# Patient Record
Sex: FEMALE | Race: BLACK OR AFRICAN AMERICAN | NOT HISPANIC OR LATINO
[De-identification: names, ages, dates, MRNs, and addresses within clinical notes are randomized per-mention and may not be internally consistent; named-entity substitution may affect disease eponyms.]

---

## 2017-06-09 PROBLEM — Z00.00 ENCOUNTER FOR PREVENTIVE HEALTH EXAMINATION: Status: ACTIVE | Noted: 2017-06-09

## 2017-06-19 ENCOUNTER — APPOINTMENT (OUTPATIENT)
Dept: HEART AND VASCULAR | Facility: CLINIC | Age: 42
End: 2017-06-19

## 2018-09-28 ENCOUNTER — APPOINTMENT (OUTPATIENT)
Dept: HEART AND VASCULAR | Facility: CLINIC | Age: 43
End: 2018-09-28
Payer: MEDICAID

## 2018-09-28 DIAGNOSIS — N92.0 EXCESSIVE AND FREQUENT MENSTRUATION WITH REGULAR CYCLE: ICD-10-CM

## 2018-09-28 DIAGNOSIS — R14.0 ABDOMINAL DISTENSION (GASEOUS): ICD-10-CM

## 2018-09-28 PROCEDURE — 99204 OFFICE O/P NEW MOD 45 MIN: CPT

## 2018-10-09 PROBLEM — R14.0 ABDOMINAL BLOATING: Status: ACTIVE | Noted: 2018-10-09

## 2018-10-09 PROBLEM — N92.0 MENORRHAGIA: Status: ACTIVE | Noted: 2018-10-09

## 2018-10-14 ENCOUNTER — APPOINTMENT (OUTPATIENT)
Dept: MRI IMAGING | Facility: HOSPITAL | Age: 43
End: 2018-10-14

## 2018-10-14 ENCOUNTER — OUTPATIENT (OUTPATIENT)
Dept: OUTPATIENT SERVICES | Facility: HOSPITAL | Age: 43
LOS: 1 days | End: 2018-10-14
Payer: COMMERCIAL

## 2018-10-14 PROCEDURE — 72196 MRI PELVIS W/DYE: CPT | Mod: 26

## 2018-10-14 PROCEDURE — A9585: CPT

## 2018-10-14 PROCEDURE — 72196 MRI PELVIS W/DYE: CPT

## 2018-11-02 ENCOUNTER — CLINICAL ADVICE (OUTPATIENT)
Age: 43
End: 2018-11-02

## 2018-11-26 VITALS
OXYGEN SATURATION: 99 % | TEMPERATURE: 98 F | WEIGHT: 119.93 LBS | DIASTOLIC BLOOD PRESSURE: 112 MMHG | HEART RATE: 56 BPM | HEIGHT: 64 IN | SYSTOLIC BLOOD PRESSURE: 177 MMHG | RESPIRATION RATE: 16 BRPM

## 2018-11-26 RX ORDER — CHLORHEXIDINE GLUCONATE 213 G/1000ML
1 SOLUTION TOPICAL ONCE
Qty: 0 | Refills: 0 | Status: DISCONTINUED | OUTPATIENT
Start: 2018-11-27 | End: 2018-11-28

## 2018-11-26 NOTE — H&P ADULT - ASSESSMENT
42-year-old  female, occasional marijuana smoker, with FamHx of CVA (grandfather) and PMHx of HTN with ?TIA 2/2 HTN Emergency per pt (2 years ago) and known multiple uterine fibroids who presents for Uterine fibroid embolization under general anesthesia       ASA III Mallampati III  Consent to be obtained by Dr. Diaz's team  Started IVF NS @ 75cc/h     Risks & benefits of procedure and alternative therapy have been explained to the patient including but not limited to: allergic reaction, bleeding w/possible need for blood transfusion, infection, renal and vascular compromise, limb damage, emergent surgery. Informed consent obtained and in chart. 42-year-old  female, occasional marijuana smoker, with FamHx of CVA (grandfather) and PMHx of HTN with ?TIA 2/2 HTN Emergency per pt (2 years ago) and known multiple uterine fibroids who presents for Uterine fibroid embolization under general anesthesia       ASA III Mallampati III  Consent to be obtained by Dr. Diaz's team      Risks & benefits of procedure and alternative therapy have been explained to the patient including but not limited to: allergic reaction, bleeding w/possible need for blood transfusion, infection, renal and vascular compromise, limb damage, emergent surgery. Informed consent obtained and in chart. 42-year-old  female, occasional marijuana smoker, with FamHx of CVA (grandfather) and PMHx of HTN with ?TIA 2/2 HTN Emergency per pt (2 years ago) and known multiple uterine fibroids who presents for Uterine fibroid embolization under general anesthesia       ASA III Mallampati III  Consent to be obtained by Dr. Diaz's team  Pt /112 repeat /120. Per pt not any home meds for BP. Pt given IV Hydralazine 10mg x1      Risks & benefits of procedure and alternative therapy have been explained to the patient including but not limited to: allergic reaction, bleeding w/possible need for blood transfusion, infection, renal and vascular compromise, limb damage, emergent surgery. Informed consent obtained and in chart.

## 2018-11-26 NOTE — H&P ADULT - FAMILY HISTORY
Grandparent  Still living? No  Family history of cerebrovascular accident (CVA), Age at diagnosis: Age Unknown

## 2018-11-26 NOTE — H&P ADULT - HISTORY OF PRESENT ILLNESS
42-year-old  female, occasional marijuana smoker, with FamHx of CVA (grandfather) and PMHx of HTN with ?TIA 2/2 HTN Emergency per pt (2 years ago) and known multiple uterine fibroids who presented to Dr. Diaz c/o very heavy periods over the past year, to the point where her hemoglobin was down to 6 requiring blood transfusion ([facility]) about 6 months ago. She says an ultrasound at that time showed uterine fibroids. While she said she does not have a regular gynecologist, she states that when she was in the hospital for the menorrhagia this year they did a biopsy and Pap smear. She says in addition to the menorrhagia which lasts about 7 days as opposed to her former three-day period, she also has bulk symptoms with urinary frequency and lower abdominal pressure. She thinks she wants to have one more child but is not sure. Treatment options including hysterectomy, fibroid embolization and other possibilities were discussed with Dr. Diaz and pt was sent for MRI to assess for candidacy regarding uterine fibroid embolization. MRI Pelvis w/ oral and IV contrast on 10/14/18 revealed fibroid uterus containing at least 5 enhancing intramural fibroids, arcuate uterus, and adenomyosis at the uterine fundus making her a good candidate for uterine fibroid embolization with Dr. Diaz.

## 2018-11-27 ENCOUNTER — INPATIENT (INPATIENT)
Facility: HOSPITAL | Age: 43
LOS: 0 days | Discharge: HOME | DRG: 750 | End: 2018-11-28
Attending: RADIOLOGY | Admitting: RADIOLOGY
Payer: COMMERCIAL

## 2018-11-27 DIAGNOSIS — Z98.82 BREAST IMPLANT STATUS: Chronic | ICD-10-CM

## 2018-11-27 DIAGNOSIS — Z98.891 HISTORY OF UTERINE SCAR FROM PREVIOUS SURGERY: Chronic | ICD-10-CM

## 2018-11-27 LAB
ANION GAP SERPL CALC-SCNC: 11 MMOL/L — SIGNIFICANT CHANGE UP (ref 5–17)
APTT BLD: 28 SEC — SIGNIFICANT CHANGE UP (ref 27.5–36.3)
BASOPHILS NFR BLD AUTO: 0.3 % — SIGNIFICANT CHANGE UP (ref 0–2)
BUN SERPL-MCNC: 12 MG/DL — SIGNIFICANT CHANGE UP (ref 7–23)
CALCIUM SERPL-MCNC: 8.8 MG/DL — SIGNIFICANT CHANGE UP (ref 8.4–10.5)
CHLORIDE SERPL-SCNC: 107 MMOL/L — SIGNIFICANT CHANGE UP (ref 96–108)
CO2 SERPL-SCNC: 23 MMOL/L — SIGNIFICANT CHANGE UP (ref 22–31)
CREAT SERPL-MCNC: 0.75 MG/DL — SIGNIFICANT CHANGE UP (ref 0.5–1.3)
EOSINOPHIL NFR BLD AUTO: 2.1 % — SIGNIFICANT CHANGE UP (ref 0–6)
GLUCOSE SERPL-MCNC: 95 MG/DL — SIGNIFICANT CHANGE UP (ref 70–99)
HCG SERPL-ACNC: <.1 MIU/ML — SIGNIFICANT CHANGE UP
HCT VFR BLD CALC: 33.8 % — LOW (ref 34.5–45)
HGB BLD-MCNC: 11.1 G/DL — LOW (ref 11.5–15.5)
INR BLD: 1.15 — SIGNIFICANT CHANGE UP (ref 0.88–1.16)
LYMPHOCYTES # BLD AUTO: 19.3 % — SIGNIFICANT CHANGE UP (ref 13–44)
MCHC RBC-ENTMCNC: 26.6 PG — LOW (ref 27–34)
MCHC RBC-ENTMCNC: 32.8 G/DL — SIGNIFICANT CHANGE UP (ref 32–36)
MCV RBC AUTO: 80.9 FL — SIGNIFICANT CHANGE UP (ref 80–100)
MONOCYTES NFR BLD AUTO: 7.8 % — SIGNIFICANT CHANGE UP (ref 2–14)
NEUTROPHILS NFR BLD AUTO: 70.5 % — SIGNIFICANT CHANGE UP (ref 43–77)
PLATELET # BLD AUTO: 213 K/UL — SIGNIFICANT CHANGE UP (ref 150–400)
POTASSIUM SERPL-MCNC: 3.3 MMOL/L — LOW (ref 3.5–5.3)
POTASSIUM SERPL-SCNC: 3.3 MMOL/L — LOW (ref 3.5–5.3)
PROTHROM AB SERPL-ACNC: 13.1 SEC — HIGH (ref 10–12.9)
RBC # BLD: 4.18 M/UL — SIGNIFICANT CHANGE UP (ref 3.8–5.2)
RBC # FLD: 15.3 % — SIGNIFICANT CHANGE UP (ref 10.3–16.9)
SODIUM SERPL-SCNC: 141 MMOL/L — SIGNIFICANT CHANGE UP (ref 135–145)
WBC # BLD: 7.2 K/UL — SIGNIFICANT CHANGE UP (ref 3.8–10.5)
WBC # FLD AUTO: 7.2 K/UL — SIGNIFICANT CHANGE UP (ref 3.8–10.5)

## 2018-11-27 PROCEDURE — 37243 VASC EMBOLIZE/OCCLUDE ORGAN: CPT

## 2018-11-27 PROCEDURE — 93010 ELECTROCARDIOGRAM REPORT: CPT | Mod: 59

## 2018-11-27 RX ORDER — HYDRALAZINE HCL 50 MG
10 TABLET ORAL ONCE
Qty: 0 | Refills: 0 | Status: COMPLETED | OUTPATIENT
Start: 2018-11-27 | End: 2018-11-27

## 2018-11-27 RX ORDER — ONDANSETRON 8 MG/1
4 TABLET, FILM COATED ORAL EVERY 4 HOURS
Qty: 0 | Refills: 0 | Status: DISCONTINUED | OUTPATIENT
Start: 2018-11-27 | End: 2018-11-28

## 2018-11-27 RX ORDER — SODIUM CHLORIDE 9 MG/ML
1000 INJECTION INTRAMUSCULAR; INTRAVENOUS; SUBCUTANEOUS
Qty: 0 | Refills: 0 | Status: DISCONTINUED | OUTPATIENT
Start: 2018-11-27 | End: 2018-11-28

## 2018-11-27 RX ORDER — CEFAZOLIN SODIUM 1 G
1000 VIAL (EA) INJECTION EVERY 8 HOURS
Qty: 0 | Refills: 0 | Status: COMPLETED | OUTPATIENT
Start: 2018-11-27 | End: 2018-11-28

## 2018-11-27 RX ORDER — HYDROMORPHONE HYDROCHLORIDE 2 MG/ML
30 INJECTION INTRAMUSCULAR; INTRAVENOUS; SUBCUTANEOUS
Qty: 0 | Refills: 0 | Status: DISCONTINUED | OUTPATIENT
Start: 2018-11-27 | End: 2018-11-28

## 2018-11-27 RX ORDER — KETOROLAC TROMETHAMINE 30 MG/ML
15 SYRINGE (ML) INJECTION EVERY 6 HOURS
Qty: 0 | Refills: 0 | Status: DISCONTINUED | OUTPATIENT
Start: 2018-11-27 | End: 2018-11-28

## 2018-11-27 RX ADMIN — Medication 15 MILLIGRAM(S): at 16:20

## 2018-11-27 RX ADMIN — Medication 100 MILLIGRAM(S): at 16:20

## 2018-11-27 RX ADMIN — ONDANSETRON 4 MILLIGRAM(S): 8 TABLET, FILM COATED ORAL at 16:09

## 2018-11-27 RX ADMIN — Medication 15 MILLIGRAM(S): at 21:17

## 2018-11-27 RX ADMIN — ONDANSETRON 4 MILLIGRAM(S): 8 TABLET, FILM COATED ORAL at 20:47

## 2018-11-27 RX ADMIN — Medication 15 MILLIGRAM(S): at 16:13

## 2018-11-27 RX ADMIN — Medication 10 MILLIGRAM(S): at 09:59

## 2018-11-27 RX ADMIN — SODIUM CHLORIDE 75 MILLILITER(S): 9 INJECTION INTRAMUSCULAR; INTRAVENOUS; SUBCUTANEOUS at 12:08

## 2018-11-27 RX ADMIN — HYDROMORPHONE HYDROCHLORIDE 30 MILLILITER(S): 2 INJECTION INTRAMUSCULAR; INTRAVENOUS; SUBCUTANEOUS at 12:09

## 2018-11-27 RX ADMIN — Medication 15 MILLIGRAM(S): at 21:42

## 2018-11-27 NOTE — BRIEF OPERATIVE NOTE - PROCEDURE
<<-----Click on this checkbox to enter Procedure Transcatheter embolization  11/27/2018    Active  SARI

## 2018-11-27 NOTE — BRIEF OPERATIVE NOTE - COMMENTS
At end of case, sheath removed from groin, and hemostasis achieved after 15 minutes of manual compression. Groin soft, no hematoma.

## 2018-11-28 ENCOUNTER — TRANSCRIPTION ENCOUNTER (OUTPATIENT)
Age: 43
End: 2018-11-28

## 2018-11-28 VITALS — TEMPERATURE: 99 F

## 2018-11-28 LAB
ANION GAP SERPL CALC-SCNC: 17 MMOL/L — SIGNIFICANT CHANGE UP (ref 5–17)
BUN SERPL-MCNC: 14 MG/DL — SIGNIFICANT CHANGE UP (ref 7–23)
CALCIUM SERPL-MCNC: 9 MG/DL — SIGNIFICANT CHANGE UP (ref 8.4–10.5)
CHLORIDE SERPL-SCNC: 99 MMOL/L — SIGNIFICANT CHANGE UP (ref 96–108)
CO2 SERPL-SCNC: 21 MMOL/L — LOW (ref 22–31)
CREAT SERPL-MCNC: 0.82 MG/DL — SIGNIFICANT CHANGE UP (ref 0.5–1.3)
GLUCOSE SERPL-MCNC: 185 MG/DL — HIGH (ref 70–99)
HCT VFR BLD CALC: 31.5 % — LOW (ref 34.5–45)
HGB BLD-MCNC: 10.5 G/DL — LOW (ref 11.5–15.5)
MCHC RBC-ENTMCNC: 27.2 PG — SIGNIFICANT CHANGE UP (ref 27–34)
MCHC RBC-ENTMCNC: 33.3 G/DL — SIGNIFICANT CHANGE UP (ref 32–36)
MCV RBC AUTO: 81.6 FL — SIGNIFICANT CHANGE UP (ref 80–100)
PLATELET # BLD AUTO: 214 K/UL — SIGNIFICANT CHANGE UP (ref 150–400)
POTASSIUM SERPL-MCNC: 3.6 MMOL/L — SIGNIFICANT CHANGE UP (ref 3.5–5.3)
POTASSIUM SERPL-SCNC: 3.6 MMOL/L — SIGNIFICANT CHANGE UP (ref 3.5–5.3)
RBC # BLD: 3.86 M/UL — SIGNIFICANT CHANGE UP (ref 3.8–5.2)
RBC # FLD: 15.4 % — SIGNIFICANT CHANGE UP (ref 10.3–16.9)
SODIUM SERPL-SCNC: 137 MMOL/L — SIGNIFICANT CHANGE UP (ref 135–145)
WBC # BLD: 14.6 K/UL — HIGH (ref 3.8–10.5)
WBC # FLD AUTO: 14.6 K/UL — HIGH (ref 3.8–10.5)

## 2018-11-28 RX ORDER — POTASSIUM CHLORIDE 20 MEQ
40 PACKET (EA) ORAL ONCE
Qty: 0 | Refills: 0 | Status: COMPLETED | OUTPATIENT
Start: 2018-11-28 | End: 2018-11-28

## 2018-11-28 RX ORDER — CEPHALEXIN 500 MG
1 CAPSULE ORAL
Qty: 28 | Refills: 0 | OUTPATIENT
Start: 2018-11-28 | End: 2018-12-04

## 2018-11-28 RX ORDER — KETOROLAC TROMETHAMINE 30 MG/ML
1 SYRINGE (ML) INJECTION
Qty: 12 | Refills: 0 | OUTPATIENT
Start: 2018-11-28 | End: 2018-11-30

## 2018-11-28 RX ADMIN — Medication 15 MILLIGRAM(S): at 06:00

## 2018-11-28 RX ADMIN — Medication 100 MILLIGRAM(S): at 00:07

## 2018-11-28 RX ADMIN — Medication 15 MILLIGRAM(S): at 05:17

## 2018-11-28 RX ADMIN — Medication 15 MILLIGRAM(S): at 11:24

## 2018-11-28 RX ADMIN — Medication 40 MILLIEQUIVALENT(S): at 11:24

## 2018-11-28 RX ADMIN — Medication 100 MILLIGRAM(S): at 07:53

## 2018-11-28 RX ADMIN — Medication 15 MILLIGRAM(S): at 12:20

## 2018-11-28 NOTE — DISCHARGE NOTE ADULT - CARE PROVIDER_API CALL
Roshan Diaz (MD), Diagnostic Radiology  [address]  [address] [place] [address]  Phone: [phone]  Fax: [phone]

## 2018-11-28 NOTE — DISCHARGE NOTE ADULT - PLAN OF CARE
continue medications, follow up You underwent a uterine fibroid embolization. You can remove the dressing over the right groin tonight. For any bleeding or hematoma formation (hardened blood collection under the skin) at the access site of right groin, please hold pressure and go to the emergency room. Avoid baths or swimming for 5 days, you may shower. Avoid strenuous activity for 5 days. Continue keflex 4 times a day for one week to prevent infection. Take pain medication as needed. Please follow up with Dr. Diaz in 4 weeks.

## 2018-11-28 NOTE — DISCHARGE NOTE ADULT - CARE PLAN
Principal Discharge DX:	Uterine fibroid  Goal:	continue medications, follow up  Assessment and plan of treatment:	You underwent a uterine fibroid embolization. You can remove the dressing over the right groin tonight. For any bleeding or hematoma formation (hardened blood collection under the skin) at the access site of right groin, please hold pressure and go to the emergency room. Avoid baths or swimming for 5 days, you may shower. Avoid strenuous activity for 5 days. Continue keflex 4 times a day for one week to prevent infection. Take pain medication as needed. Please follow up with Dr. Diaz in 4 weeks.

## 2018-11-28 NOTE — DISCHARGE NOTE ADULT - MEDICATION SUMMARY - MEDICATIONS TO TAKE
I will START or STAY ON the medications listed below when I get home from the hospital:    Percocet 5/325 oral tablet  -- 1 tab(s) by mouth every 4 hours, As Needed -for severe pain MDD:6   -- Caution federal law prohibits the transfer of this drug to any person other  than the person for whom it was prescribed.  May cause drowsiness.  Alcohol may intensify this effect.  Use care when operating dangerous machinery.  This prescription cannot be refilled.  This product contains acetaminophen.  Do not use  with any other product containing acetaminophen to prevent possible liver damage.  Using more of this medication than prescribed may cause serious breathing problems.    -- Indication: For as needed for pain    ketorolac 10 mg oral tablet  -- 1 tab(s) by mouth every 6 hours   -- It is very important that you take or use this exactly as directed.  Do not skip doses or discontinue unless directed by your doctor.  May cause drowsiness or dizziness.  Obtain medical advice before taking any non-prescription drugs as some may affect the action of this medication.  Take with food or milk.    -- Indication: For as needed for pain    Keflex 500 mg oral capsule  -- 1 cap(s) by mouth 4 times a day   -- Finish all this medication unless otherwise directed by prescriber.    -- Indication: For infection prevention

## 2018-11-28 NOTE — DISCHARGE NOTE ADULT - HOSPITAL COURSE
41yo  F, occasional marijuana smoker, with FamHx of CVA (grandfather) and PMHx of HTN with ?TIA 2/2 HTN Emergency per pt (2 years ago) and known multiple uterine fibroids who presented to Dr. Diaz c/o very heavy periods over the past year, to the point where her hemoglobin was down to 6 requiring blood transfusion ([facility]) about 6 months ago. She says an ultrasound at that time showed uterine fibroids. While she said she does not have a regular gynecologist, she states that when she was in the hospital for the menorrhagia this year they did a biopsy and Pap smear. She says in addition to the menorrhagia which lasts about 7 days as opposed to her former three-day period, she also has bulk symptoms with urinary frequency and lower abdominal pressure. She thinks she wants to have one more child but is not sure. Treatment options including hysterectomy, fibroid embolization and other possibilities were discussed with Dr. Diaz and pt was sent for MRI to assess for candidacy regarding uterine fibroid embolization. MRI Pelvis w/ oral and IV contrast on 10/14/18 revealed fibroid uterus containing at least 5 enhancing intramural fibroids, arcuate uterus, and adenomyosis at the uterine fundus making her a good candidate for uterine fibroid embolization with Dr. Diaz. Patient underwent uterine fibroid embolization on 18. Patient was seen and examined this AM and was asymptomatic without complaints. Patient's VSS, labs and tele reviewed. Patient is stable for discharge per Dr. Diaz. Patient has been given appropriate discharge instructions including medication regimen, access site management and follow up.    Temp 96.9F, HR 86bpm, /87, RR 18, O2 sat 100% RA  Gen: NAD, A&O x 3  Cards: RRR, clear S1 and S2 without murmur  Pulm: CTA b/l without w/r/r  Abd: BS x 4, soft, NT  Right groin: No hematoma or ooze, femoral pulse 2+ no bruit, DP/PT 2+  Ext: No LE edema or ulcerations b/l

## 2018-11-28 NOTE — DISCHARGE NOTE ADULT - PATIENT PORTAL LINK FT
You can access the [facility] Patient Portal, offered by [facility], by registering with the following website: [email or web address]

## 2018-12-03 DIAGNOSIS — D25.9 LEIOMYOMA OF UTERUS, UNSPECIFIED: ICD-10-CM

## 2018-12-03 DIAGNOSIS — Z86.73 PERSONAL HISTORY OF TRANSIENT ISCHEMIC ATTACK (TIA), AND CEREBRAL INFARCTION WITHOUT RESIDUAL DEFICITS: ICD-10-CM

## 2018-12-03 DIAGNOSIS — D25.1 INTRAMURAL LEIOMYOMA OF UTERUS: ICD-10-CM

## 2018-12-03 DIAGNOSIS — I10 ESSENTIAL (PRIMARY) HYPERTENSION: ICD-10-CM

## 2018-12-03 DIAGNOSIS — I25.10 ATHEROSCLEROTIC HEART DISEASE OF NATIVE CORONARY ARTERY WITHOUT ANGINA PECTORIS: ICD-10-CM

## 2019-01-09 PROBLEM — D25.9 LEIOMYOMA OF UTERUS, UNSPECIFIED: Chronic | Status: ACTIVE | Noted: 2018-11-26

## 2019-01-09 PROBLEM — G45.9 TRANSIENT CEREBRAL ISCHEMIC ATTACK, UNSPECIFIED: Chronic | Status: ACTIVE | Noted: 2018-11-26

## 2019-01-09 PROBLEM — I10 ESSENTIAL (PRIMARY) HYPERTENSION: Chronic | Status: ACTIVE | Noted: 2018-11-26

## 2019-01-14 ENCOUNTER — APPOINTMENT (OUTPATIENT)
Dept: HEART AND VASCULAR | Facility: CLINIC | Age: 44
End: 2019-01-14

## 2019-02-08 ENCOUNTER — APPOINTMENT (OUTPATIENT)
Dept: HEART AND VASCULAR | Facility: CLINIC | Age: 44
End: 2019-02-08
Payer: MEDICAID

## 2019-02-08 DIAGNOSIS — D25.9 LEIOMYOMA OF UTERUS, UNSPECIFIED: ICD-10-CM

## 2019-02-08 PROCEDURE — 99213 OFFICE O/P EST LOW 20 MIN: CPT

## 2019-02-12 PROBLEM — D25.9 UTERINE FIBROID: Status: ACTIVE | Noted: 2018-09-28

## 2019-02-12 NOTE — PHYSICAL EXAM
[Alert] : alert [No Acute Distress] : no acute distress [PERRL] : pupils equal, round and reactive to light [Normal Hearing] : hearing was normal [No Neck Mass] : no neck mass was observed [No Respiratory Distress] : no respiratory distress [Normal Rate] : heart rate was normal  [Regular Rhythm] : with a regular rhythm [No Edema] : there was no peripheral edema [Not Tender] : non-tender [Not Distended] : not distended [Normal Gait] : normal gait [Normal Strength/Tone] : muscle strength and tone were normal [No Rash] : no rash [No Skin Lesions] : no skin lesions [No Motor Deficits] : the motor exam was normal [Oriented x3] : oriented to person, place, and time

## 2019-02-12 NOTE — ASSESSMENT
[FreeTextEntry1] : This is a 43-year-old female 2 months status post uterine fibroid embolization for symptoms of severe menorrhagia with anemia, urinary frequency and bulk symptoms. She's doing very well and says that all of the presenting symptoms have resolved. She is having normal periods with no excessive bleeding. She states the urinary frequency has also resolved. She asked about pregnancy and I told her that she could still get pregnant if she desired although even after the embolization there was no guarantee of her fertility. It wasn't quite clear whether she was interested in pregnancy or whether she needed to continue birth control. I told her in the event she desired pregnancy she should wait at least another 3 or 4 months before trying. She said she occasionally gets a little lightheaded and we gave her a prescription for a CBC just to check her for anemia. I told her if her blood work looks fine no other followup was necessary from our perspective. 23-Jan-2018 21:18

## 2019-07-10 PROCEDURE — C1887: CPT

## 2019-07-10 PROCEDURE — 85730 THROMBOPLASTIN TIME PARTIAL: CPT

## 2019-07-10 PROCEDURE — 80048 BASIC METABOLIC PNL TOTAL CA: CPT

## 2019-07-10 PROCEDURE — 93005 ELECTROCARDIOGRAM TRACING: CPT

## 2019-07-10 PROCEDURE — 85027 COMPLETE CBC AUTOMATED: CPT

## 2019-07-10 PROCEDURE — 36415 COLL VENOUS BLD VENIPUNCTURE: CPT

## 2019-07-10 PROCEDURE — 84702 CHORIONIC GONADOTROPIN TEST: CPT

## 2019-07-10 PROCEDURE — 85610 PROTHROMBIN TIME: CPT

## 2019-07-10 PROCEDURE — 85025 COMPLETE CBC W/AUTO DIFF WBC: CPT

## 2019-07-10 PROCEDURE — C1889: CPT

## 2019-07-10 PROCEDURE — C1769: CPT

## 2019-07-10 PROCEDURE — C1894: CPT
